# Patient Record
Sex: MALE
[De-identification: names, ages, dates, MRNs, and addresses within clinical notes are randomized per-mention and may not be internally consistent; named-entity substitution may affect disease eponyms.]

---

## 2021-06-01 ENCOUNTER — OFFICE VISIT (OUTPATIENT)
Age: 41
End: 2021-06-01

## 2021-10-04 ENCOUNTER — OFFICE VISIT (OUTPATIENT)
Dept: URBAN - METROPOLITAN AREA CLINIC 9 | Facility: CLINIC | Age: 41
End: 2021-10-04

## 2021-10-04 ENCOUNTER — TELEPHONE ENCOUNTER (OUTPATIENT)
Dept: URBAN - METROPOLITAN AREA CLINIC 9 | Facility: CLINIC | Age: 41
End: 2021-10-04

## 2022-07-30 ENCOUNTER — TELEPHONE ENCOUNTER (OUTPATIENT)
Age: 42
End: 2022-07-30

## 2022-07-31 ENCOUNTER — TELEPHONE ENCOUNTER (OUTPATIENT)
Age: 42
End: 2022-07-31

## 2024-12-02 ENCOUNTER — OFFICE VISIT (OUTPATIENT)
Dept: URBAN - METROPOLITAN AREA CLINIC 9 | Facility: CLINIC | Age: 44
End: 2024-12-02
Payer: COMMERCIAL

## 2024-12-02 VITALS
DIASTOLIC BLOOD PRESSURE: 80 MMHG | HEIGHT: 73 IN | BODY MASS INDEX: 23.86 KG/M2 | SYSTOLIC BLOOD PRESSURE: 120 MMHG | WEIGHT: 180 LBS

## 2024-12-02 DIAGNOSIS — K64.5 THROMBOSED EXTERNAL HEMORRHOID: ICD-10-CM

## 2024-12-02 DIAGNOSIS — Z15.09 GENETIC SUSCEPTIBILITY TO OTHER MALIGNANT NEOPLASM: ICD-10-CM

## 2024-12-02 DIAGNOSIS — K62.89 RECTAL PAIN: ICD-10-CM

## 2024-12-02 DIAGNOSIS — Z12.11 COLON CANCER SCREENING: ICD-10-CM

## 2024-12-02 DIAGNOSIS — Z15.01 GENETIC SUSCEPTIBILITY TO MALIGNANT NEOPLASM OF BREAST: ICD-10-CM

## 2024-12-02 PROCEDURE — 99204 OFFICE O/P NEW MOD 45 MIN: CPT | Performed by: STUDENT IN AN ORGANIZED HEALTH CARE EDUCATION/TRAINING PROGRAM

## 2024-12-02 RX ORDER — LIDOCAINE AND MINERAL OIL AND PHENYLEPHRINE HCL AND WHITE PETROLATUM 50; 170; 2.5; 39 MG/G; MG/G; MG/G; MG/G
AS DIRECTED CREAM TOPICAL
Qty: 30 GRAM | Refills: 0 | OUTPATIENT
Start: 2024-12-02 | End: 2024-12-09

## 2024-12-02 NOTE — HPI-TODAY'S VISIT:
EGD:   Colonoscopy:   Imaging/Studies/Procedures:   - -   PMH:  BRCA 1 positive,  Family Hx: Mother- BRCA mutation, breast cancer  GI Hx: 12/2/24- The patient, Tae, came in with a primary complaint of a painful bump in the anal area. He reported that the bump had been present for about 5 days. The bump was described as feeling like a marble and was located on the outside, around the rim of the anus. The patient reported no discharge, diarrhea, or blood in the stool. The pain was localized to the bump and was particularly intense when touched. In addition to the current issue, the patient also mentioned that he had experienced itching in the same area for about five months prior to the appearance of the bump. He had been using a cream, prescribed for his wife, which seemed to alleviate the pain temporarily. The patient has a family history of BRCA mutation. He had seen Doctor Rey about 3 years ago for similar reasons.

## 2024-12-10 ENCOUNTER — DASHBOARD ENCOUNTERS (OUTPATIENT)
Age: 44
End: 2024-12-10